# Patient Record
Sex: MALE | Race: WHITE | NOT HISPANIC OR LATINO | URBAN - METROPOLITAN AREA
[De-identification: names, ages, dates, MRNs, and addresses within clinical notes are randomized per-mention and may not be internally consistent; named-entity substitution may affect disease eponyms.]

---

## 2024-03-09 ENCOUNTER — OFFICE VISIT (OUTPATIENT)
Dept: URGENT CARE | Facility: CLINIC | Age: 39
End: 2024-03-09
Payer: COMMERCIAL

## 2024-03-09 VITALS
HEART RATE: 91 BPM | RESPIRATION RATE: 16 BRPM | OXYGEN SATURATION: 94 % | BODY MASS INDEX: 39.53 KG/M2 | WEIGHT: 308 LBS | DIASTOLIC BLOOD PRESSURE: 86 MMHG | SYSTOLIC BLOOD PRESSURE: 128 MMHG | HEIGHT: 74 IN | TEMPERATURE: 98 F

## 2024-03-09 DIAGNOSIS — S86.812A STRAIN OF LEFT CALF MUSCLE: ICD-10-CM

## 2024-03-09 PROCEDURE — 99203 OFFICE O/P NEW LOW 30 MIN: CPT | Performed by: STUDENT IN AN ORGANIZED HEALTH CARE EDUCATION/TRAINING PROGRAM

## 2024-03-09 PROCEDURE — 3079F DIAST BP 80-89 MM HG: CPT | Performed by: STUDENT IN AN ORGANIZED HEALTH CARE EDUCATION/TRAINING PROGRAM

## 2024-03-09 PROCEDURE — 3074F SYST BP LT 130 MM HG: CPT | Performed by: STUDENT IN AN ORGANIZED HEALTH CARE EDUCATION/TRAINING PROGRAM

## 2024-03-09 RX ORDER — LOSARTAN POTASSIUM 50 MG/1
50 TABLET ORAL DAILY
COMMUNITY

## 2024-03-09 RX ORDER — AMLODIPINE AND BENAZEPRIL HYDROCHLORIDE 5; 40 MG/1; MG/1
1 CAPSULE ORAL DAILY
COMMUNITY

## 2024-03-09 NOTE — PROGRESS NOTES
"Subjective:   Herber George is a 38 y.o. male who presents for Leg Injury (Left leg injury, pain with full flex, unable to bare full weight on leg )      HPI:  38-year-old male presents to the urgent care for left lower leg pain that initially started a month ago.  Was intermittently occurring with movement.  Felt like it was starting to get better.  States that today he jogged across a crosswalk and started experiencing acute pain in the left posterior calf.  Pain is only present with trying to bear weight and bending his leg.  No pain at rest.  He has not noticed any swelling to the leg, redness, numbness, tingling, burning, or radiation of pain up or down the leg.  No personal history of DVT.  No shortness of breath, chest pain, or cough.    Medications:    amlodipine-benazepril  losartan Tabs    Allergies: Patient has no known allergies.    Problem List: Herber George does not have a problem list on file.    Surgical History:  No past surgical history on file.    Past Social Hx: Herber George  reports that he has never smoked. He has never used smokeless tobacco. He reports current alcohol use. He reports that he does not use drugs.     Past Family Hx:  Herber George family history is not on file.     Problem list, medications, and allergies reviewed by myself today in Epic.     Objective:     /86 (BP Location: Right arm, Patient Position: Sitting, BP Cuff Size: Large adult)   Pulse 91   Temp 36.7 °C (98 °F) (Temporal)   Resp 16   Ht 1.88 m (6' 2\")   Wt (!) 140 kg (308 lb)   SpO2 94%   BMI 39.54 kg/m²     Physical Exam  Vitals reviewed.   Constitutional:       Appearance: Normal appearance.   Cardiovascular:      Rate and Rhythm: Normal rate.      Pulses: Normal pulses.   Pulmonary:      Effort: Pulmonary effort is normal. No respiratory distress.      Breath sounds: Normal breath sounds. No stridor. No wheezing, rhonchi or rales.   Musculoskeletal:      Comments: Left " lower leg: Tenderness to the mid posterior calf with palpation.  No pain at rest.  Limited range of motion with flexion due to pain.  Full active extension but does have tight pulling sensation with this.  No swelling or erythema to the leg.  Same size as the right leg.  No pitting edema.  No palpable cords.  Negative Grubbs test.  Able to plantarflex and dorsiflex the foot although painful with plantarflexion.   Neurological:      Mental Status: He is alert.         Assessment/Plan:     Diagnosis and associated orders:     1. Strain of left calf muscle           Comments/MDM:     Patient's presentation physical exam findings are consistent with strain of the left calf muscle.  Been intermittent over the past month but acutely worsened while jogging across a crosswalk.  Pain is only with active range of motion and palpation.  No pain at rest.  No physical exam findings consistent with DVT.  Patient was offered ultrasound imaging but declined.  I believe this to be reasonable given very low suspicion for this.  Mechanism of injury more consistent with musculoskeletal etiology.  Patient was offered referral to sports medicine and physical therapy although patient lives out of town and will follow-up with his PCP when he arrives home.  Discussed ibuprofen as well as ice/heat.  Elevate the leg as often as possible at home.  Discussed signs of DVT that warrant immediate reevaluation.         Differential diagnosis, natural history, supportive care, and indications for immediate follow-up discussed.    Advised the patient to follow-up with the primary care physician for recheck, reevaluation, and consideration of further management.    Please note that this dictation was created using voice recognition software. I have made a reasonable attempt to correct obvious errors, but I expect that there are errors of grammar and possibly content that I did not discover before finalizing the note.    Electronically signed by  Issac Cabral PA-C.